# Patient Record
Sex: FEMALE | Race: WHITE | URBAN - METROPOLITAN AREA
[De-identification: names, ages, dates, MRNs, and addresses within clinical notes are randomized per-mention and may not be internally consistent; named-entity substitution may affect disease eponyms.]

---

## 2020-06-11 ENCOUNTER — OFFICE VISIT (OUTPATIENT)
Dept: URBAN - METROPOLITAN AREA CLINIC 80 | Facility: CLINIC | Age: 55
End: 2020-06-11

## 2020-06-16 ENCOUNTER — OFFICE VISIT (OUTPATIENT)
Dept: URBAN - METROPOLITAN AREA TELEHEALTH 2 | Facility: TELEHEALTH | Age: 55
End: 2020-06-16

## 2020-06-16 DIAGNOSIS — Z90.49 HISTORY OF CHOLECYSTECTOMY: ICD-10-CM

## 2020-06-16 DIAGNOSIS — K57.92 DIVERTICULITIS: ICD-10-CM

## 2020-06-16 DIAGNOSIS — K58.0 IRRITABLE BOWEL SYNDROME WITH DIARRHEA: ICD-10-CM

## 2020-06-16 DIAGNOSIS — Z98.890 HISTORY OF COLON SURGERY: ICD-10-CM

## 2020-06-16 PROCEDURE — G8417 CALC BMI ABV UP PARAM F/U: HCPCS | Performed by: INTERNAL MEDICINE

## 2020-06-16 PROCEDURE — 3017F COLORECTAL CA SCREEN DOC REV: CPT | Performed by: INTERNAL MEDICINE

## 2020-06-16 PROCEDURE — G8428 CUR MEDS NOT DOCUMENT: HCPCS | Performed by: INTERNAL MEDICINE

## 2020-06-16 PROCEDURE — 99213 OFFICE O/P EST LOW 20 MIN: CPT | Performed by: INTERNAL MEDICINE

## 2020-06-16 RX ORDER — FAMOTIDINE 20 MG/1
TAKE 1 TABLET (20 MG) BY ORAL ROUTE 2 TIMES PER DAY TABLET ORAL 2
Qty: 0 | Refills: 0 | Status: ACTIVE | COMMUNITY
Start: 1900-01-01 | End: 1900-01-01

## 2020-06-16 RX ORDER — ESTRADIOL 0.05 MG/D
APPLY 1 PATCH BY TRANSDERMAL ROUTE 2 TIMES A WEEK PATCH TRANSDERMAL 2
Qty: 1 | Refills: 0 | Status: ACTIVE | COMMUNITY
Start: 1900-01-01 | End: 1900-01-01

## 2020-06-16 RX ORDER — ESZOPICLONE 2 MG/1
TABLET, FILM COATED ORAL
Qty: 0 | Refills: 0 | Status: ACTIVE | COMMUNITY
Start: 1900-01-01 | End: 1900-01-01

## 2020-06-16 RX ORDER — ALPRAZOLAM 0.25 MG/1
TAKE 1 TABLET BY ORAL ROUTE ONCE A DAY (AT BEDTIME) AS NEEDED TABLET ORAL 1
Qty: 0 | Refills: 0 | Status: ACTIVE | COMMUNITY
Start: 1900-01-01 | End: 1900-01-01

## 2020-06-16 RX ORDER — FLUTICASONE PROPIONATE 50 UG/1
SPRAY 1 SPRAY (50 MCG) IN EACH NOSTRIL BY INTRANASAL ROUTE ONCE DAILY SPRAY, METERED NASAL 1
Qty: 1 | Refills: 0 | Status: ACTIVE | COMMUNITY
Start: 1900-01-01 | End: 1900-01-01

## 2020-06-16 RX ORDER — OMEPRAZOLE 20 MG/1
TAKE 1 CAPSULE (20 MG) BY ORAL ROUTE ONCE DAILY BEFORE A MEAL CAPSULE, DELAYED RELEASE ORAL 1
Qty: 0 | Refills: 0 | Status: ACTIVE | COMMUNITY
Start: 1900-01-01 | End: 1900-01-01

## 2020-06-16 RX ORDER — HYOSCYAMINE SULFATE 0.12 MG/1
PLACE 1 TABLET UNDER TONGUE BY TRANSLINGUAL ROUTE 4 TIMES A DAY AS NEEDED TABLET, ORALLY DISINTEGRATING ORAL
Qty: 20 | Refills: 0 | Status: ACTIVE | COMMUNITY
Start: 2019-02-14 | End: 1900-01-01

## 2020-06-16 RX ORDER — NITROFURANTOIN 100 MG/1
CAPSULE ORAL
Qty: 0 | Refills: 0 | Status: ACTIVE | COMMUNITY
Start: 1900-01-01 | End: 1900-01-01

## 2020-06-16 RX ORDER — FLUTICASONE FUROATE AND VILANTEROL TRIFENATATE 200; 25 UG/1; UG/1
INHALE 1 PUFF BY INHALATION ROUTE ONCE DAILY AT THE SAME TIME EACH DAY POWDER RESPIRATORY (INHALATION) 1
Qty: 1 | Refills: 0 | Status: ACTIVE | COMMUNITY
Start: 1900-01-01 | End: 1900-01-01

## 2020-06-16 RX ORDER — FLUOXETINE 20 MG/1
CAPSULE ORAL
Qty: 0 | Refills: 0 | Status: ACTIVE | COMMUNITY
Start: 1900-01-01 | End: 1900-01-01

## 2020-06-16 RX ORDER — ATENOLOL 100 MG/1
TAKE 1 TABLET (100 MG) BY ORAL ROUTE ONCE DAILY TABLET ORAL 1
Qty: 0 | Refills: 0 | Status: ACTIVE | COMMUNITY
Start: 1900-01-01 | End: 1900-01-01

## 2020-06-16 RX ORDER — CHLORTHALIDONE 50 MG/1
TABLET ORAL
Qty: 0 | Refills: 0 | Status: ACTIVE | COMMUNITY
Start: 1900-01-01 | End: 1900-01-01

## 2020-06-16 RX ORDER — LEVOTHYROXINE SODIUM 75 UG/1
TABLET ORAL
Qty: 0 | Refills: 0 | Status: ACTIVE | COMMUNITY
Start: 1900-01-01 | End: 1900-01-01

## 2020-06-16 NOTE — HPI-TODAY'S VISIT:
Last seen 2019 Few weeks back:  abd pain in L side, low down; noticed it with BM even moving was very tender Contacted us:  Rx Cipro/flagyl; helped sx they resolved- states it did take about a week before it completely resolved.

## 2021-01-15 ENCOUNTER — TELEPHONE ENCOUNTER (OUTPATIENT)
Dept: URBAN - METROPOLITAN AREA CLINIC 80 | Facility: CLINIC | Age: 56
End: 2021-01-15

## 2021-01-22 LAB — GASTROINTESTINAL PATHOGEN: (no result)

## 2021-01-29 ENCOUNTER — TELEPHONE ENCOUNTER (OUTPATIENT)
Dept: URBAN - METROPOLITAN AREA CLINIC 92 | Facility: CLINIC | Age: 56
End: 2021-01-29

## 2021-02-05 ENCOUNTER — OFFICE VISIT (OUTPATIENT)
Dept: URBAN - METROPOLITAN AREA TELEHEALTH 2 | Facility: TELEHEALTH | Age: 56
End: 2021-02-05

## 2021-02-05 DIAGNOSIS — R19.7 DIARRHEA: ICD-10-CM

## 2021-02-05 DIAGNOSIS — K58.0 IRRITABLE BOWEL SYNDROME WITH DIARRHEA: ICD-10-CM

## 2021-02-05 DIAGNOSIS — K57.92 DIVERTICULITIS: ICD-10-CM

## 2021-02-05 PROBLEM — 197125005: Status: ACTIVE | Noted: 2020-06-16

## 2021-02-05 PROCEDURE — G8417 CALC BMI ABV UP PARAM F/U: HCPCS | Performed by: INTERNAL MEDICINE

## 2021-02-05 PROCEDURE — G8427 DOCREV CUR MEDS BY ELIG CLIN: HCPCS | Performed by: INTERNAL MEDICINE

## 2021-02-05 PROCEDURE — 99213 OFFICE O/P EST LOW 20 MIN: CPT | Performed by: INTERNAL MEDICINE

## 2021-02-05 PROCEDURE — 1036F TOBACCO NON-USER: CPT | Performed by: INTERNAL MEDICINE

## 2021-02-05 PROCEDURE — G8482 FLU IMMUNIZE ORDER/ADMIN: HCPCS | Performed by: INTERNAL MEDICINE

## 2021-02-05 RX ORDER — ESTRADIOL 0.05 MG/D
APPLY 1 PATCH BY TRANSDERMAL ROUTE 2 TIMES A WEEK PATCH TRANSDERMAL 2
Qty: 1 | Refills: 0 | Status: ACTIVE | COMMUNITY
Start: 1900-01-01

## 2021-02-05 RX ORDER — HYOSCYAMINE SULFATE 0.12 MG/1
PLACE 1 TABLET UNDER TONGUE BY TRANSLINGUAL ROUTE 4 TIMES A DAY AS NEEDED TABLET, ORALLY DISINTEGRATING ORAL
Qty: 20 | Refills: 0 | Status: DISCONTINUED | COMMUNITY
Start: 2019-02-14

## 2021-02-05 RX ORDER — ESZOPICLONE 2 MG/1
TABLET, FILM COATED ORAL
Qty: 0 | Refills: 0 | Status: DISCONTINUED | COMMUNITY
Start: 1900-01-01

## 2021-02-05 RX ORDER — FLUOXETINE 20 MG/1
CAPSULE ORAL
Qty: 0 | Refills: 0 | Status: DISCONTINUED | COMMUNITY
Start: 1900-01-01

## 2021-02-05 RX ORDER — LEVOTHYROXINE SODIUM 75 UG/1
TABLET ORAL
Qty: 0 | Refills: 0 | Status: ACTIVE | COMMUNITY
Start: 1900-01-01

## 2021-02-05 RX ORDER — ATENOLOL 100 MG/1
TAKE 1 TABLET (100 MG) BY ORAL ROUTE ONCE DAILY TABLET ORAL 1
Qty: 0 | Refills: 0 | Status: DISCONTINUED | COMMUNITY
Start: 1900-01-01

## 2021-02-05 RX ORDER — CHLORTHALIDONE 50 MG/1
TABLET ORAL
Qty: 0 | Refills: 0 | Status: ACTIVE | COMMUNITY
Start: 1900-01-01

## 2021-02-05 RX ORDER — FLUTICASONE FUROATE AND VILANTEROL TRIFENATATE 200; 25 UG/1; UG/1
INHALE 1 PUFF BY INHALATION ROUTE ONCE DAILY AT THE SAME TIME EACH DAY POWDER RESPIRATORY (INHALATION) 1
Qty: 1 | Refills: 0 | Status: DISCONTINUED | COMMUNITY
Start: 1900-01-01

## 2021-02-05 RX ORDER — ALPRAZOLAM 0.25 MG/1
TAKE 1 TABLET BY ORAL ROUTE ONCE A DAY (AT BEDTIME) AS NEEDED TABLET ORAL 1
Qty: 0 | Refills: 0 | Status: ACTIVE | COMMUNITY
Start: 1900-01-01

## 2021-02-05 RX ORDER — OMEPRAZOLE 20 MG/1
TAKE 1 CAPSULE (20 MG) BY ORAL ROUTE ONCE DAILY BEFORE A MEAL CAPSULE, DELAYED RELEASE ORAL 1
Qty: 0 | Refills: 0 | Status: ACTIVE | COMMUNITY
Start: 1900-01-01

## 2021-02-05 RX ORDER — FLUTICASONE PROPIONATE 50 UG/1
SPRAY 1 SPRAY (50 MCG) IN EACH NOSTRIL BY INTRANASAL ROUTE ONCE DAILY SPRAY, METERED NASAL 1
Qty: 1 | Refills: 0 | Status: ACTIVE | COMMUNITY
Start: 1900-01-01

## 2021-02-05 RX ORDER — FAMOTIDINE 20 MG/1
TAKE 1 TABLET (20 MG) BY ORAL ROUTE 2 TIMES PER DAY TABLET ORAL 2
Qty: 0 | Refills: 0 | Status: DISCONTINUED | COMMUNITY
Start: 1900-01-01

## 2021-02-05 RX ORDER — NITROFURANTOIN 100 MG/1
CAPSULE ORAL
Qty: 0 | Refills: 0 | Status: ACTIVE | COMMUNITY
Start: 1900-01-01

## 2021-02-05 NOTE — HPI-TODAY'S VISIT:
Diarrhea severe a weeks ago, to the point she was soiling herself  No idea what brought it on? did she have a bug? No n/fever.  No sick contacts.  Pt wonders if she gluten sensitive.

## 2021-04-01 ENCOUNTER — TELEPHONE ENCOUNTER (OUTPATIENT)
Dept: URBAN - METROPOLITAN AREA CLINIC 80 | Facility: CLINIC | Age: 56
End: 2021-04-01

## 2021-04-01 RX ORDER — RIFAXIMIN 550 MG/1
1 TABLET TABLET ORAL THREE TIMES A DAY
Qty: 42 TABLET | Refills: 1 | OUTPATIENT
Start: 2021-04-02 | End: 2021-04-30

## 2021-04-20 ENCOUNTER — CLAIMS CREATED FROM THE CLAIM WINDOW (OUTPATIENT)
Dept: URBAN - METROPOLITAN AREA TELEHEALTH 2 | Facility: TELEHEALTH | Age: 56
End: 2021-04-20

## 2021-04-20 ENCOUNTER — OFFICE VISIT (OUTPATIENT)
Dept: URBAN - METROPOLITAN AREA TELEHEALTH 2 | Facility: TELEHEALTH | Age: 56
End: 2021-04-20

## 2021-04-20 DIAGNOSIS — K58.9 IRRITABLE BOWEL SYNDROME: ICD-10-CM

## 2021-04-20 DIAGNOSIS — K58.0 IRRITABLE BOWEL SYNDROME WITH DIARRHEA: ICD-10-CM

## 2021-04-20 DIAGNOSIS — K57.92 DIVERTICULITIS: ICD-10-CM

## 2021-04-20 DIAGNOSIS — R19.7 DIARRHEA: ICD-10-CM

## 2021-04-20 DIAGNOSIS — K57.32 DIVERTICULITIS LARGE INTESTINE: ICD-10-CM

## 2021-04-20 DIAGNOSIS — K58.9 IBS (IRRITABLE BOWEL SYNDROME): ICD-10-CM

## 2021-04-20 PROCEDURE — 99213 OFFICE O/P EST LOW 20 MIN: CPT | Performed by: INTERNAL MEDICINE

## 2021-04-20 RX ORDER — NITROFURANTOIN 100 MG/1
CAPSULE ORAL
Qty: 0 | Refills: 0 | Status: ACTIVE | COMMUNITY
Start: 1900-01-01

## 2021-04-20 RX ORDER — RIFAXIMIN 550 MG/1
1 TABLET TABLET ORAL THREE TIMES A DAY
Qty: 42 TABLET | Refills: 1 | Status: ACTIVE | COMMUNITY
Start: 2021-04-02 | End: 2021-04-30

## 2021-04-20 RX ORDER — CHOLESTYRAMINE 4 G/9G
1 PACKET MIXED WITH WATER OR NON-CARBONATED DRINK POWDER, FOR SUSPENSION ORAL BID
Qty: 60 | Refills: 2 | OUTPATIENT
Start: 2021-04-20

## 2021-04-20 RX ORDER — ALPRAZOLAM 0.25 MG/1
TAKE 1 TABLET BY ORAL ROUTE ONCE A DAY (AT BEDTIME) AS NEEDED TABLET ORAL 1
Qty: 0 | Refills: 0 | Status: ACTIVE | COMMUNITY
Start: 1900-01-01

## 2021-04-20 RX ORDER — FLUTICASONE PROPIONATE 50 UG/1
SPRAY 1 SPRAY (50 MCG) IN EACH NOSTRIL BY INTRANASAL ROUTE ONCE DAILY SPRAY, METERED NASAL 1
Qty: 1 | Refills: 0 | Status: ACTIVE | COMMUNITY
Start: 1900-01-01

## 2021-04-20 RX ORDER — RIFAXIMIN 550 MG/1
1 TABLET TABLET ORAL THREE TIMES A DAY
Qty: 42 TABLET | Refills: 1 | OUTPATIENT
Start: 2021-04-20 | End: 2021-05-18

## 2021-04-20 RX ORDER — LEVOTHYROXINE SODIUM 75 UG/1
TABLET ORAL
Qty: 0 | Refills: 0 | Status: ACTIVE | COMMUNITY
Start: 1900-01-01

## 2021-04-20 RX ORDER — ESTRADIOL 0.05 MG/D
APPLY 1 PATCH BY TRANSDERMAL ROUTE 2 TIMES A WEEK PATCH TRANSDERMAL 2
Qty: 1 | Refills: 0 | Status: ACTIVE | COMMUNITY
Start: 1900-01-01

## 2021-04-20 RX ORDER — CHLORTHALIDONE 50 MG/1
TABLET ORAL
Qty: 0 | Refills: 0 | Status: ACTIVE | COMMUNITY
Start: 1900-01-01

## 2021-04-20 RX ORDER — OMEPRAZOLE 20 MG/1
TAKE 1 CAPSULE (20 MG) BY ORAL ROUTE ONCE DAILY BEFORE A MEAL CAPSULE, DELAYED RELEASE ORAL 1
Qty: 0 | Refills: 0 | Status: ACTIVE | COMMUNITY
Start: 1900-01-01

## 2021-05-19 ENCOUNTER — TELEPHONE ENCOUNTER (OUTPATIENT)
Dept: URBAN - METROPOLITAN AREA CLINIC 80 | Facility: CLINIC | Age: 56
End: 2021-05-19

## 2021-05-19 RX ORDER — CHOLESTYRAMINE 4 G/9G
1 PACKET MIXED WITH WATER OR NON-CARBONATED DRINK POWDER, FOR SUSPENSION ORAL BID
Qty: 60 | Refills: 2
Start: 2021-04-20

## 2021-08-17 ENCOUNTER — ERX REFILL RESPONSE (OUTPATIENT)
Dept: URBAN - METROPOLITAN AREA CLINIC 80 | Facility: CLINIC | Age: 56
End: 2021-08-17

## 2021-08-17 RX ORDER — CHOLESTYRAMINE 4 G/9G
1 PACKET MIXED WITH WATER OR NON-CARBONATED AND DRINK REPEAT TWICE A DAY ORALLY 30 DAY(S) POWDER, FOR SUSPENSION ORAL
Qty: 180 PACKET | Refills: 1 | OUTPATIENT

## 2021-08-17 RX ORDER — CHOLESTYRAMINE 4 G/9G
1 PACKET MIXED WITH WATER OR NON-CARBONATED DRINK POWDER, FOR SUSPENSION ORAL BID
Qty: 60 | Refills: 2 | OUTPATIENT

## 2022-05-09 ENCOUNTER — TELEPHONE ENCOUNTER (OUTPATIENT)
Dept: URBAN - METROPOLITAN AREA CLINIC 80 | Facility: CLINIC | Age: 57
End: 2022-05-09

## 2022-05-09 RX ORDER — FLUTICASONE PROPIONATE 50 UG/1
SPRAY 1 SPRAY (50 MCG) IN EACH NOSTRIL BY INTRANASAL ROUTE ONCE DAILY SPRAY, METERED NASAL 1
Qty: 1 | Refills: 0 | Status: ACTIVE | COMMUNITY
Start: 1900-01-01

## 2022-05-09 RX ORDER — CHOLESTYRAMINE 4 G/9G
1 PACKET MIXED WITH WATER OR NON-CARBONATED DRINK POWDER, FOR SUSPENSION ORAL BID
Refills: 0 | OUTPATIENT
Start: 2022-05-09

## 2022-05-09 RX ORDER — NITROFURANTOIN 100 MG/1
CAPSULE ORAL
Qty: 0 | Refills: 0 | Status: ACTIVE | COMMUNITY
Start: 1900-01-01

## 2022-05-09 RX ORDER — ALPRAZOLAM 0.25 MG/1
TAKE 1 TABLET BY ORAL ROUTE ONCE A DAY (AT BEDTIME) AS NEEDED TABLET ORAL 1
Qty: 0 | Refills: 0 | Status: ACTIVE | COMMUNITY
Start: 1900-01-01

## 2022-05-09 RX ORDER — LEVOTHYROXINE SODIUM 75 UG/1
TABLET ORAL
Qty: 0 | Refills: 0 | Status: ACTIVE | COMMUNITY
Start: 1900-01-01

## 2022-05-09 RX ORDER — CHOLESTYRAMINE 4 G/9G
1 PACKET MIXED WITH WATER OR NON-CARBONATED AND DRINK REPEAT TWICE A DAY ORALLY 30 DAY(S) POWDER, FOR SUSPENSION ORAL
Qty: 180 PACKET | Refills: 1 | Status: ACTIVE | COMMUNITY

## 2022-05-09 RX ORDER — OMEPRAZOLE 20 MG/1
TAKE 1 CAPSULE (20 MG) BY ORAL ROUTE ONCE DAILY BEFORE A MEAL CAPSULE, DELAYED RELEASE ORAL 1
Qty: 0 | Refills: 0 | Status: ACTIVE | COMMUNITY
Start: 1900-01-01

## 2022-05-09 RX ORDER — ESTRADIOL 0.05 MG/D
APPLY 1 PATCH BY TRANSDERMAL ROUTE 2 TIMES A WEEK PATCH TRANSDERMAL 2
Qty: 1 | Refills: 0 | Status: ACTIVE | COMMUNITY
Start: 1900-01-01

## 2022-05-09 RX ORDER — CHLORTHALIDONE 50 MG/1
TABLET ORAL
Qty: 0 | Refills: 0 | Status: ACTIVE | COMMUNITY
Start: 1900-01-01

## 2023-03-27 ENCOUNTER — P2P PATIENT RECORD (OUTPATIENT)
Age: 58
End: 2023-03-27

## 2023-06-13 ENCOUNTER — TELEPHONE ENCOUNTER (OUTPATIENT)
Dept: URBAN - METROPOLITAN AREA CLINIC 80 | Facility: CLINIC | Age: 58
End: 2023-06-13

## 2023-06-13 RX ORDER — CIPROFLOXACIN HYDROCHLORIDE 250 MG/1
1 TABLET TABLET, FILM COATED ORAL
Qty: 20 TABLET | Refills: 0 | OUTPATIENT
Start: 2023-06-14 | End: 2023-06-24

## 2023-06-13 RX ORDER — METRONIDAZOLE 500 MG/1
1 TABLET TABLET ORAL THREE TIMES A DAY
Qty: 30 | OUTPATIENT
Start: 2023-06-14 | End: 2023-06-24

## 2023-06-20 ENCOUNTER — CLAIMS CREATED FROM THE CLAIM WINDOW (OUTPATIENT)
Dept: URBAN - METROPOLITAN AREA TELEHEALTH 2 | Facility: TELEHEALTH | Age: 58
End: 2023-06-20

## 2023-06-20 ENCOUNTER — OFFICE VISIT (OUTPATIENT)
Dept: URBAN - METROPOLITAN AREA TELEHEALTH 2 | Facility: TELEHEALTH | Age: 58
End: 2023-06-20

## 2023-06-20 ENCOUNTER — DASHBOARD ENCOUNTERS (OUTPATIENT)
Age: 58
End: 2023-06-20

## 2023-06-20 VITALS — HEIGHT: 64 IN

## 2023-06-20 DIAGNOSIS — K58.0 IRRITABLE BOWEL SYNDROME WITH DIARRHEA: ICD-10-CM

## 2023-06-20 DIAGNOSIS — K57.92 DIVERTICULITIS: ICD-10-CM

## 2023-06-20 DIAGNOSIS — Z12.11 SCREENING FOR COLON CANCER: ICD-10-CM

## 2023-06-20 DIAGNOSIS — R19.7 DIARRHEA: ICD-10-CM

## 2023-06-20 DIAGNOSIS — K58.1 IBS: ICD-10-CM

## 2023-06-20 PROCEDURE — 99214 OFFICE O/P EST MOD 30 MIN: CPT | Performed by: INTERNAL MEDICINE

## 2023-06-20 RX ORDER — HYOSCYAMINE SULFATE 0.12 MG/1
1 TABLET UNDER THE TONGUE AND ALLOW TO DISSOLVE AS NEEDED TABLET, ORALLY DISINTEGRATING ORAL
Qty: 40 | Refills: 1 | OUTPATIENT
Start: 2023-06-20 | End: 2023-08-19

## 2023-06-20 RX ORDER — LEVOTHYROXINE SODIUM 75 UG/1
TABLET ORAL
Qty: 0 | Refills: 0 | Status: ACTIVE | COMMUNITY
Start: 1900-01-01

## 2023-06-20 RX ORDER — OMEPRAZOLE 20 MG/1
TAKE 1 CAPSULE (20 MG) BY ORAL ROUTE ONCE DAILY BEFORE A MEAL CAPSULE, DELAYED RELEASE ORAL 1
Qty: 0 | Refills: 0 | Status: ACTIVE | COMMUNITY
Start: 1900-01-01

## 2023-06-20 RX ORDER — ALPRAZOLAM 0.25 MG/1
TAKE 1 TABLET BY ORAL ROUTE ONCE A DAY (AT BEDTIME) AS NEEDED TABLET ORAL 1
Qty: 0 | Refills: 0 | Status: ACTIVE | COMMUNITY
Start: 1900-01-01

## 2023-06-20 RX ORDER — RIFAXIMIN 550 MG/1
1 TABLET TABLET ORAL THREE TIMES A DAY
Qty: 42 TABLET | Refills: 1 | OUTPATIENT
Start: 2023-06-20 | End: 2023-07-18

## 2023-06-20 RX ORDER — NITROFURANTOIN 100 MG/1
CAPSULE ORAL
Qty: 0 | Refills: 0 | Status: ACTIVE | COMMUNITY
Start: 1900-01-01

## 2023-06-20 RX ORDER — CIPROFLOXACIN HYDROCHLORIDE 250 MG/1
1 TABLET TABLET, FILM COATED ORAL
Qty: 20 TABLET | Refills: 0 | Status: ACTIVE | COMMUNITY
Start: 2023-06-14 | End: 2023-06-24

## 2023-06-20 RX ORDER — METRONIDAZOLE 500 MG/1
1 TABLET TABLET ORAL THREE TIMES A DAY
Qty: 30 | Status: ACTIVE | COMMUNITY
Start: 2023-06-14 | End: 2023-06-24

## 2023-06-20 RX ORDER — FLUTICASONE PROPIONATE 50 UG/1
SPRAY 1 SPRAY (50 MCG) IN EACH NOSTRIL BY INTRANASAL ROUTE ONCE DAILY SPRAY, METERED NASAL 1
Qty: 1 | Refills: 0 | Status: ACTIVE | COMMUNITY
Start: 1900-01-01

## 2023-06-20 RX ORDER — CHOLESTYRAMINE 4 G/9G
1 PACKET MIXED WITH WATER OR NON-CARBONATED AND DRINK REPEAT TWICE A DAY ORALLY 30 DAY(S) POWDER, FOR SUSPENSION ORAL
Qty: 180 PACKET | Refills: 1 | Status: ACTIVE | COMMUNITY

## 2023-06-20 RX ORDER — COLESEVELAM HYDROCHLORIDE 625 MG/1
1 TABLETS WITH MEALS TABLET, FILM COATED ORAL TWICE A DAY
Qty: 60 TABLET | Refills: 3 | OUTPATIENT
Start: 2023-06-20

## 2023-06-20 RX ORDER — CHOLESTYRAMINE 4 G/9G
1 PACKET MIXED WITH WATER OR NON-CARBONATED DRINK POWDER, FOR SUSPENSION ORAL BID
Refills: 0 | Status: ACTIVE | COMMUNITY
Start: 2022-05-09

## 2023-06-20 RX ORDER — ESTRADIOL 0.05 MG/D
APPLY 1 PATCH BY TRANSDERMAL ROUTE 2 TIMES A WEEK PATCH TRANSDERMAL 2
Qty: 1 | Refills: 0 | Status: ACTIVE | COMMUNITY
Start: 1900-01-01

## 2023-06-20 RX ORDER — CHLORTHALIDONE 50 MG/1
TABLET ORAL
Qty: 0 | Refills: 0 | Status: ACTIVE | COMMUNITY
Start: 1900-01-01

## 2023-06-20 NOTE — HPI-TODAY'S VISIT:
Had episode diverticulitis, things it may have been caused by eating a lot of strawberries.  Had some bad diarrhea. Now much better on Abx.   IBS with diarrhea is still problematic.

## 2023-07-20 ENCOUNTER — WEB ENCOUNTER (OUTPATIENT)
Dept: URBAN - METROPOLITAN AREA CLINIC 80 | Facility: CLINIC | Age: 58
End: 2023-07-20

## 2023-07-20 RX ORDER — SODIUM, POTASSIUM,MAG SULFATES 17.5-3.13G
177ML SOLUTION, RECONSTITUTED, ORAL ORAL
Qty: 356 MILLILITER | Refills: 0 | OUTPATIENT
Start: 2023-07-22 | End: 2023-07-23

## 2023-08-15 ENCOUNTER — CLAIMS CREATED FROM THE CLAIM WINDOW (OUTPATIENT)
Dept: URBAN - METROPOLITAN AREA SURGERY CENTER 19 | Facility: SURGERY CENTER | Age: 58
End: 2023-08-15

## 2023-08-15 DIAGNOSIS — D12.0 ADENOMA OF CECUM: ICD-10-CM

## 2023-08-15 DIAGNOSIS — Z12.11 COLON CANCER SCREENING: ICD-10-CM

## 2023-08-15 PROCEDURE — G8907 PT DOC NO EVENTS ON DISCHARG: HCPCS | Performed by: INTERNAL MEDICINE

## 2023-08-15 PROCEDURE — 45385 COLONOSCOPY W/LESION REMOVAL: CPT | Performed by: INTERNAL MEDICINE

## 2023-08-15 RX ORDER — COLESEVELAM HYDROCHLORIDE 625 MG/1
1 TABLETS WITH MEALS TABLET, FILM COATED ORAL TWICE A DAY
Qty: 60 TABLET | Refills: 3 | Status: ACTIVE | COMMUNITY
Start: 2023-06-20

## 2023-08-15 RX ORDER — OMEPRAZOLE 20 MG/1
TAKE 1 CAPSULE (20 MG) BY ORAL ROUTE ONCE DAILY BEFORE A MEAL CAPSULE, DELAYED RELEASE ORAL 1
Qty: 0 | Refills: 0 | Status: ACTIVE | COMMUNITY
Start: 1900-01-01

## 2023-08-15 RX ORDER — FLUTICASONE PROPIONATE 50 UG/1
SPRAY 1 SPRAY (50 MCG) IN EACH NOSTRIL BY INTRANASAL ROUTE ONCE DAILY SPRAY, METERED NASAL 1
Qty: 1 | Refills: 0 | Status: ACTIVE | COMMUNITY
Start: 1900-01-01

## 2023-08-15 RX ORDER — ESTRADIOL 0.05 MG/D
APPLY 1 PATCH BY TRANSDERMAL ROUTE 2 TIMES A WEEK PATCH TRANSDERMAL 2
Qty: 1 | Refills: 0 | Status: ACTIVE | COMMUNITY
Start: 1900-01-01

## 2023-08-15 RX ORDER — HYOSCYAMINE SULFATE 0.12 MG/1
1 TABLET UNDER THE TONGUE AND ALLOW TO DISSOLVE AS NEEDED TABLET, ORALLY DISINTEGRATING ORAL
Qty: 40 | Refills: 1 | Status: ACTIVE | COMMUNITY
Start: 2023-06-20 | End: 2023-08-19

## 2023-08-15 RX ORDER — CHOLESTYRAMINE 4 G/9G
1 PACKET MIXED WITH WATER OR NON-CARBONATED AND DRINK REPEAT TWICE A DAY ORALLY 30 DAY(S) POWDER, FOR SUSPENSION ORAL
Qty: 180 PACKET | Refills: 1 | Status: ACTIVE | COMMUNITY

## 2023-08-15 RX ORDER — LEVOTHYROXINE SODIUM 75 UG/1
TABLET ORAL
Qty: 0 | Refills: 0 | Status: ACTIVE | COMMUNITY
Start: 1900-01-01

## 2023-08-15 RX ORDER — CHOLESTYRAMINE 4 G/9G
1 PACKET MIXED WITH WATER OR NON-CARBONATED DRINK POWDER, FOR SUSPENSION ORAL BID
Refills: 0 | Status: ACTIVE | COMMUNITY
Start: 2022-05-09

## 2023-08-15 RX ORDER — ALPRAZOLAM 0.25 MG/1
TAKE 1 TABLET BY ORAL ROUTE ONCE A DAY (AT BEDTIME) AS NEEDED TABLET ORAL 1
Qty: 0 | Refills: 0 | Status: ACTIVE | COMMUNITY
Start: 1900-01-01

## 2023-08-15 RX ORDER — NITROFURANTOIN 100 MG/1
CAPSULE ORAL
Qty: 0 | Refills: 0 | Status: ACTIVE | COMMUNITY
Start: 1900-01-01

## 2023-08-15 RX ORDER — CHLORTHALIDONE 50 MG/1
TABLET ORAL
Qty: 0 | Refills: 0 | Status: ACTIVE | COMMUNITY
Start: 1900-01-01

## 2023-09-08 ENCOUNTER — TELEPHONE ENCOUNTER (OUTPATIENT)
Dept: URBAN - METROPOLITAN AREA CLINIC 80 | Facility: CLINIC | Age: 58
End: 2023-09-08

## 2023-09-08 RX ORDER — RIFAXIMIN 550 MG/1
1 TABLET TABLET ORAL THREE TIMES A DAY
Qty: 42 TABLET | Refills: 0 | OUTPATIENT
Start: 2023-09-08 | End: 2023-09-22

## 2023-12-15 ENCOUNTER — TELEPHONE ENCOUNTER (OUTPATIENT)
Dept: URBAN - METROPOLITAN AREA CLINIC 80 | Facility: CLINIC | Age: 58
End: 2023-12-15

## 2023-12-15 RX ORDER — COLESEVELAM HYDROCHLORIDE 625 MG/1
1 TABLETS WITH MEALS TABLET, FILM COATED ORAL TWICE A DAY
Qty: 60 TABLET | Refills: 3
Start: 2023-06-20